# Patient Record
Sex: MALE | Race: WHITE
[De-identification: names, ages, dates, MRNs, and addresses within clinical notes are randomized per-mention and may not be internally consistent; named-entity substitution may affect disease eponyms.]

---

## 2017-10-16 ENCOUNTER — HOSPITAL ENCOUNTER (OUTPATIENT)
Dept: HOSPITAL 5 - XRAY | Age: 47
Discharge: HOME | End: 2017-10-16
Attending: FAMILY MEDICINE
Payer: COMMERCIAL

## 2017-10-16 DIAGNOSIS — M25.532: Primary | ICD-10-CM

## 2017-10-16 NOTE — XRAY REPORT
LEFT WRIST RADIOGRAPHS



INDICATION: Left wrist pain for 6 months.



COMPARISON: None similar at this institution.



FINDINGS: AP and lateral left wrist radiographs demonstrate intact 

carpal rows and also remainder imaged bones. Unremarkable soft tissues.



CONCLUSION: Normal left wrist radiographs, as described. 



Thank you for the opportunity to participate in this patient's care.

## 2018-01-21 ENCOUNTER — HOSPITAL ENCOUNTER (EMERGENCY)
Dept: HOSPITAL 5 - ED | Age: 48
Discharge: HOME | End: 2018-01-21
Payer: COMMERCIAL

## 2018-01-21 VITALS — DIASTOLIC BLOOD PRESSURE: 88 MMHG | SYSTOLIC BLOOD PRESSURE: 132 MMHG

## 2018-01-21 DIAGNOSIS — Z87.891: ICD-10-CM

## 2018-01-21 DIAGNOSIS — S00.511A: ICD-10-CM

## 2018-01-21 DIAGNOSIS — S80.811A: ICD-10-CM
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
## 2018-01-21 DIAGNOSIS — Z79.01: ICD-10-CM
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
## 2018-01-21 DIAGNOSIS — S80.812A: ICD-10-CM
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
## 2018-01-21 DIAGNOSIS — S00.91XA: Primary | ICD-10-CM

## 2018-01-21 DIAGNOSIS — I48.91: ICD-10-CM

## 2018-01-21 DIAGNOSIS — I10: ICD-10-CM

## 2018-01-21 DIAGNOSIS — S00.31XA: ICD-10-CM

## 2018-01-21 LAB
ALBUMIN SERPL-MCNC: 4.4 G/DL (ref 3.9–5)
ALT SERPL-CCNC: 24 UNITS/L (ref 7–56)
BUN SERPL-MCNC: 20 MG/DL (ref 9–20)
BUN/CREAT SERPL: 18 %
CALCIUM SERPL-MCNC: 9.1 MG/DL (ref 8.4–10.2)
HCT VFR BLD CALC: 38.5 % (ref 35.5–45.6)
HEMOLYSIS INDEX: 4
HGB BLD-MCNC: 13.2 GM/DL (ref 11.8–15.2)
INR PPP: 1.86 (ref 0.87–1.13)
MCH RBC QN AUTO: 33 PG (ref 28–32)
MCHC RBC AUTO-ENTMCNC: 34 % (ref 32–34)
MCV RBC AUTO: 95 FL (ref 84–94)
PLATELET # BLD: 323 K/MM3 (ref 140–440)
RBC # BLD AUTO: 4.04 M/MM3 (ref 3.65–5.03)

## 2018-01-21 PROCEDURE — 85610 PROTHROMBIN TIME: CPT

## 2018-01-21 PROCEDURE — 36415 COLL VENOUS BLD VENIPUNCTURE: CPT

## 2018-01-21 PROCEDURE — 90471 IMMUNIZATION ADMIN: CPT

## 2018-01-21 PROCEDURE — 85027 COMPLETE CBC AUTOMATED: CPT

## 2018-01-21 PROCEDURE — 70450 CT HEAD/BRAIN W/O DYE: CPT

## 2018-01-21 PROCEDURE — 80053 COMPREHEN METABOLIC PANEL: CPT

## 2018-01-21 PROCEDURE — 90715 TDAP VACCINE 7 YRS/> IM: CPT
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
